# Patient Record
Sex: MALE | Race: WHITE | Employment: FULL TIME
[De-identification: names, ages, dates, MRNs, and addresses within clinical notes are randomized per-mention and may not be internally consistent; named-entity substitution may affect disease eponyms.]

---

## 2018-01-18 ENCOUNTER — RECORDS - HEALTHEAST (OUTPATIENT)
Dept: ADMINISTRATIVE | Facility: OTHER | Age: 58
End: 2018-01-18

## 2019-12-27 ENCOUNTER — TRANSFERRED RECORDS (OUTPATIENT)
Dept: HEALTH INFORMATION MANAGEMENT | Facility: CLINIC | Age: 59
End: 2019-12-27

## 2020-08-28 ENCOUNTER — TRANSFERRED RECORDS (OUTPATIENT)
Dept: HEALTH INFORMATION MANAGEMENT | Facility: CLINIC | Age: 60
End: 2020-08-28

## 2020-09-03 ENCOUNTER — TRANSCRIBE ORDERS (OUTPATIENT)
Dept: OTHER | Age: 60
End: 2020-09-03

## 2020-09-03 DIAGNOSIS — G50.1 ATYPICAL FACIAL PAIN: Primary | ICD-10-CM

## 2020-09-03 DIAGNOSIS — G43.909 MIGRAINE: ICD-10-CM

## 2020-09-08 ENCOUNTER — TELEPHONE (OUTPATIENT)
Dept: NEUROSURGERY | Facility: CLINIC | Age: 60
End: 2020-09-08

## 2020-09-08 NOTE — TELEPHONE ENCOUNTER
Health Call Center    Phone Message    May a detailed message be left on voicemail: yes     Reason for Call: Other: call from [facility] requesting we reach out to patient to schedule appointment for facial pain (referral in epic). Katiuska has phone number for patient listed as [phone], which differs from what we have on file. may be outdated. please call patient to schedule     Action Taken: Message routed to:  [facility]  neurosurg    Travel Screening: Not Applicable

## 2020-09-09 ENCOUNTER — TELEPHONE (OUTPATIENT)
Dept: NEUROSURGERY | Facility: CLINIC | Age: 60
End: 2020-09-09

## 2020-09-09 NOTE — TELEPHONE ENCOUNTER
[facility]    Phone Message    May a detailed message be left on voicemail: yes     Reason for Call: Other: Dilan gold Mandy's call to schedule.  His main number is now updated with the [phone] phone number as the other number he doesn't use alot.  Please call him back at your earliest convenience     Action Taken: Message routed to:  [facility]  Neurosurgery    Travel Screening: Not Applicable

## 2020-09-17 NOTE — TELEPHONE ENCOUNTER
[facility]    Phone Message    May a detailed message be left on voicemail: yes     Reason for Call: Other: Patients spouse calling in regards to patient being seen. Checking the status of scheduling an appointment. Writer noticed patient is scheduled for 10/6 - let Barbara know. Barbara stated patient went to the ED yesterday for pain. Barbara was not aware that patient was scheduled. Barbara is wondering if there is any suggestions for the pain.     Please advise.     Action Taken: Other:  NEUROSURGERY     Travel Screening: Not Applicable

## 2020-09-29 ENCOUNTER — PRE VISIT (OUTPATIENT)
Dept: NEUROSURGERY | Facility: CLINIC | Age: 60
End: 2020-09-29

## 2020-09-29 NOTE — TELEPHONE ENCOUNTER
FUTURE VISIT INFORMATION      FUTURE VISIT INFORMATION:    Date: 10/6/2020    Time: 840am    [place] [facility] [place]  REFERRAL INFORMATION:    Referring provider:      Referring providers clinic:      Reason for visit/diagnosis      RECORDS REQUESTED FROM:       Clinic name Comments Records Status Imaging Status   [facility]  Scanned to Media tab Requested          [facility] MR Cervical Spine 7/25/2014    CT Cervical Spine 7/13/2015, 1/11/2016 Requested  PACS                        Action 10.6.20 sv    Action Taken Received disc from [facility]  MR 1.10.13 head with and without  Sent to [facility] for scanning/upload

## 2020-10-06 ENCOUNTER — OFFICE VISIT (OUTPATIENT)
Dept: NEUROSURGERY | Facility: CLINIC | Age: 60
End: 2020-10-06
Payer: COMMERCIAL

## 2020-10-06 VITALS
WEIGHT: 257 LBS | RESPIRATION RATE: 16 BRPM | HEIGHT: 76 IN | DIASTOLIC BLOOD PRESSURE: 81 MMHG | OXYGEN SATURATION: 96 % | BODY MASS INDEX: 31.29 KG/M2 | HEART RATE: 66 BPM | SYSTOLIC BLOOD PRESSURE: 119 MMHG

## 2020-10-06 DIAGNOSIS — G50.0 TRIGEMINAL NEURALGIA: Primary | ICD-10-CM

## 2020-10-06 PROBLEM — E66.811 OBESITY, CLASS I, BMI 30-34.9: Status: ACTIVE | Noted: 2019-05-20

## 2020-10-06 PROBLEM — I10 ESSENTIAL HYPERTENSION: Status: ACTIVE | Noted: 2019-08-21

## 2020-10-06 PROCEDURE — 99207 PR NON-BILLABLE SERV PER CHARTING: CPT | Performed by: NEUROLOGICAL SURGERY

## 2020-10-06 RX ORDER — BUPROPION HYDROCHLORIDE 300 MG/1
150 TABLET ORAL
COMMUNITY
Start: 2020-06-01 | End: 2020-11-09

## 2020-10-06 RX ORDER — TIZANIDINE 2 MG/1
2 TABLET ORAL
COMMUNITY
Start: 2019-11-04

## 2020-10-06 ASSESSMENT — PAIN SCALES - GENERAL: PAINLEVEL: MODERATE PAIN (5)

## 2020-10-06 ASSESSMENT — MIFFLIN-ST. JEOR: SCORE: 2077.24

## 2020-10-06 NOTE — LETTER
10/6/2020       RE: Dilan Hawkins  [address]  [address]  [facility] [address]     Dear Colleague,    Thank you for referring your patient, Dilan Hawkins, to the [facility] NEUROSURGERY CLINIC [place] at [facility]. Please see a copy of my visit note below.    We saw  in clinic today for atypical facial pain.  Mr. Hawkins is accompanied by his wife.  He has been suffering from facial pain ever since he had his wisdom tooth removed when he was 17 years old.  This has been in waxing and waning with variable intensity throughout his life.  He has [place] multiple doctors for [place] the [place].  His pain originates  behind his molar teeth on the upper jaw.  He does not have typical shooting pain.  He has not had any skull base MRI performed to assess for any vascular compression over the trigeminal nerve.  This pain has caused him significant emotional distress.  We intend to get a baseline MRI base of skull as initial step in his evaluation.  We will see him back in clinic after the MRI results are back and discuss further treatment options for his facial pain.    Patient discussed with staff Dr. Esquivel.    John Lanza   [facility] Fellow  [phone]      Again, thank you for allowing me to participate in the care of your patient.  Sincerely,    Hira Esquivel MD

## 2020-10-06 NOTE — LETTER
10/6/2020       RE: Dilan Hawkins  [address]  [address]  [place] [place] [address]     Dear Colleague,    Thank you for referring your patient, Dilan Hawkins, to the [facility] NEUROSURG[facility] [place] [facility] Please see a copy of my visit note below.    We saw  in clinic today for atypical facial pain.  Mr. Hawkins is accompanied by his wife.  He has been suffering from facial pain ever since he had his wisdom tooth removed when he was 17 years old.  This has been in waxing and waning with variable intensity throughout his life.  He has seen multiple doctors for this across the state.  His pain originates  behind his molar teeth on the upper jaw.  He does not have typical shooting pain.  He has not had any skull base MRI performed to assess for any vascular compression over the trigeminal nerve.  This pain has caused him significant emotional distress.  We intend to get a baseline MRI base of skull as initial step in his evaluation.  We will see him back in clinic after the MRI results are back and discuss further treatment options for his facial pain.    Patient discussed with staff Dr. Esquivel.        John Lanza   [facility] Fellow  [phone]      Again, thank you for allowing me to participate in the care of your patient.      Sincerely,    Hira Esquivel MD

## 2020-10-06 NOTE — PATIENT INSTRUCTIONS
MRI Skull Base with and without contrast  Follow up with Dr Esquivel in 2 weeks    Call Tiny THOMPSON for questions/concerns     Thank you for using [facility]

## 2020-10-07 NOTE — PROGRESS NOTES
We saw  in clinic today for atypical facial pain.  Mr. Hawkins is accompanied by his wife.  He has been suffering from facial pain ever since he had his wisdom tooth removed when he was 17 years old.  This has been in waxing and waning with variable intensity throughout his life.  He has seen multiple doctors for this across the state.  His pain originates  behind his molar teeth on the upper jaw.  He does not have typical shooting pain.  He has not had any skull base MRI performed to assess for any vascular compression over the trigeminal nerve.  This pain has caused him significant emotional distress.  We intend to get a baseline MRI base of skull as initial step in his evaluation.  We will see him back in clinic after the MRI results are back and discuss further treatment options for his facial pain.    Patient discussed with staff Dr. Esquivel.        John CLEMENTE Fellow  [phone]

## 2020-10-08 ENCOUNTER — TELEPHONE (OUTPATIENT)
Dept: NEUROSURGERY | Facility: CLINIC | Age: 60
End: 2020-10-08

## 2020-10-08 NOTE — TELEPHONE ENCOUNTER
Return call to wife, Mignon,    Reviewed MRI Skull Base at 1130 this Friday and F/U with Dr Esquivel on Tues 10/20 @ 8am.    Patient asking for earlier appointment, that is the next time Dr Esquivel is in clinic.      Voices understanding, will keep current appts.  Patient has my name and number if needed.

## 2020-10-09 ENCOUNTER — ANCILLARY PROCEDURE (OUTPATIENT)
Dept: MRI IMAGING | Facility: CLINIC | Age: 60
End: 2020-10-09
Attending: NEUROLOGICAL SURGERY
Payer: COMMERCIAL

## 2020-10-09 DIAGNOSIS — G50.0 TRIGEMINAL NEURALGIA: ICD-10-CM

## 2020-10-09 RX ORDER — GADOBUTROL 604.72 MG/ML
7.5 INJECTION INTRAVENOUS ONCE
Status: COMPLETED | OUTPATIENT
Start: 2020-10-09 | End: 2020-10-09

## 2020-10-09 RX ADMIN — GADOBUTROL 7.5 ML: 604.72 INJECTION INTRAVENOUS at 12:41

## 2020-10-09 RX ADMIN — GADOBUTROL 4.2 ML: 604.72 INJECTION INTRAVENOUS at 12:41

## 2020-10-13 ENCOUNTER — TELEPHONE (OUTPATIENT)
Dept: NEUROSURGERY | Facility: CLINIC | Age: 60
End: 2020-10-13

## 2020-10-13 NOTE — TELEPHONE ENCOUNTER
[facility]    Phone Message    May a detailed message be left on voicemail: yes     Reason for Call: Other: Dilan calling to return a missed call from Mandy. He stated that it was very important to keep his appointment as in clinic. Please call Dilan with any questions or concerns.     Action Taken: Message routed to:  [facility]    Travel Screening: Not Applicable

## 2020-10-15 ENCOUNTER — DOCUMENTATION ONLY (OUTPATIENT)
Dept: CARE COORDINATION | Facility: CLINIC | Age: 60
End: 2020-10-15

## 2020-10-15 ENCOUNTER — TELEPHONE (OUTPATIENT)
Dept: NEUROSURGERY | Facility: CLINIC | Age: 60
End: 2020-10-15

## 2020-10-15 NOTE — TELEPHONE ENCOUNTER
[facility]    Phone Message    May a detailed message be left on voicemail: yes     Reason for Call: Other: Barbara reporting that Mandy has left msgs on pt's ph# but Barbara  reporting that Mandy will not reach pt/Barbara on that phone. Pt does not . Barbara asking Mandy to call Barbara's ph# [phone]. Thank you.    Action Taken: Message routed to:  [facility]  Neurosurgery clinic    Travel Screening: Not Applicable

## 2020-10-20 ENCOUNTER — OFFICE VISIT (OUTPATIENT)
Dept: NEUROSURGERY | Facility: CLINIC | Age: 60
End: 2020-10-20
Payer: COMMERCIAL

## 2020-10-20 VITALS
HEIGHT: 76 IN | HEART RATE: 71 BPM | OXYGEN SATURATION: 96 % | DIASTOLIC BLOOD PRESSURE: 84 MMHG | WEIGHT: 251 LBS | BODY MASS INDEX: 30.56 KG/M2 | RESPIRATION RATE: 16 BRPM | SYSTOLIC BLOOD PRESSURE: 124 MMHG

## 2020-10-20 DIAGNOSIS — G50.0 TRIGEMINAL NEURALGIA: Primary | ICD-10-CM

## 2020-10-20 PROCEDURE — 99215 OFFICE O/P EST HI 40 MIN: CPT | Performed by: NEUROLOGICAL SURGERY

## 2020-10-20 ASSESSMENT — MIFFLIN-ST. JEOR: SCORE: 2050.03

## 2020-10-20 ASSESSMENT — PAIN SCALES - GENERAL: PAINLEVEL: SEVERE PAIN (6)

## 2020-10-20 NOTE — LETTER
10/20/2020       RE: Dilan Hawkins  [address]  [address]  [place] [address]     Dear Colleague,    Thank you for referring your patient, Dilan Hawkins, to the [facility] NEUROSURGERY CLINIC [place] at [facility]. Please see a copy of my visit note below.    Service Date: 10/20/2020      Phyllis Momo,    [facility]    [address] [id number]      RE: Dilan Hawkins    MRN: [id number]   : 1960      Dear Dr. Barr:       I had the pleasure to see Emilio today in my Neurosurgical Clinic for evaluation of right-sided facial pain.      Briefly, Emilio is a 60-year-old gentleman who has had facial pain ever since the age of 17 when he had a tooth extracted.  Following that tooth extraction, he over time developed pain that was near that right tooth and then over time began to migrate into the right jaw.  Over the years this has persistently gotten worse and has been impacting his life significantly.  There have been times in his life where he has been suicidal.  He has wound up in psych wards because of it.  He has lost jobs over this.  He has seen numerous doctors over the years, tried numerous medications and has not had much success in managing this pain.  He describes he is very sensitive to medications, and so he does admit that this is a bit challenging when trying to treat this pain with some providers, but also noted that he had never really had a provider that stuck with him for a long time that has really gone through a lot of medications until at least his involvement with you.      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  I would say that it probably started with that tooth extraction when he was 17.  We did do a high-resolution MRI of the skull base, and this does not show any compression of the trigeminal nerve as is somewhat expected given the diagnosis.      Given  how sensitive he is to medication and his long history of working with providers, I think he is an ideal candidate to work with our Facial Pain group.  I would like to get him set up with our Facial Pain group, which will meet again in 4 weeks.  If it is all right with you, I anticipate that he will probably work with Jez Stroud for a while trialing different medications and work really closely with Hawa Mccord, our pharmacist, given his sensitivity to medications.  My hope would be that we can find a medication regimen that he can then continue with you on.  If we are unsuccessful in getting any medication to work, he might be then a candidate for neuromodulation, and I would ask my partner Alberto Arias then to get involved.  Again, we want to work with you with this process and keep you in the loop and ultimately have him be managed by you in the long run.  In the short term, however, if it is all right, again we will get him into our Facial Pain group and work with him for a while to try to get him on the optimal regimen.      Today I am going to start him on Cymbalta.  I again will do this in conjunction with Hawa Mccord given his sensitivity to medications.      Overall, I spent approximately 40 minutes with Dilan with the majority of this time spent in consultation and developing a treatment plan.      Again, thank you for allowing me to participate in the care of your patient.    With kindest personal regards,         COURTNEY CARRIZALES MD  D: 10/20/2020   T: 10/20/2020   MT: rodrigo      Name:     DILAN MELTON   MRN:      [id number]        Account:      [id number]   :      1960           Service Date: 10/20/2020      Document: [id number]

## 2020-10-20 NOTE — PATIENT INSTRUCTIONS
Follow up with Facial Pain clinic     Hawa Mccord, Alireza will be calling patient to discuss medication for facial pain.  Note sent by Tiny THOMPSON to Hawa to start Cymbalta.     Call Tiny THOMPSON for questions/concerns     Thank you for using [facility]

## 2020-10-20 NOTE — PROGRESS NOTES
Service Date: 10/20/2020      Phyllis Barr    [facility]    [address] [place] [address]      RE: Dilan Hawkins    MRN: [id number]   : 1960      Dear Dr. Barr:       I had the pleasure to see Emilio today in my Neurosurgical Clinic for evaluation of right-sided facial pain.      Briefly, Emilio is a 60-year-old gentleman who has had facial pain ever since the age of 17 when he had a tooth extracted.  Following that tooth extraction, he over time developed pain that was near that right tooth and then over time began to migrate into the right jaw.  Over the years this has persistently gotten worse and has been impacting his life significantly.  There have been times in his life where he has been suicidal.  He has wound up in psych wards because of it.  He has lost jobs over this.  He has seen numerous doctors over the years, tried numerous medications and has not had much success in managing this pain.  He describes he is very sensitive to medications, and so he does admit that this is a bit challenging when trying to treat this pain with some providers, but also noted that he had never really had a provider that stuck with him for a long time that has really gone through a lot of medications until at least his involvement with you.      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  I would say that it probably started with that tooth extraction when he was 17.  We did do a high-resolution MRI of the skull base, and this does not show any compression of the trigeminal nerve as is somewhat expected given the diagnosis.      Given how sensitive he is to medication and his long history of working with providers, I think he is an ideal candidate to work with our Facial Pain group.  I would like to get him set up with our Facial Pain group, which will meet again in 4 weeks.  If it is all right with you, I anticipate that he will probably work with Don  Luanne for a while trialing different medications and work really closely with Hawa Mccord, our pharmacist, given his sensitivity to medications.  My hope would be that we can find a medication regimen that he can then continue with you on.  If we are unsuccessful in getting any medication to work, he might be then a candidate for neuromodulation, and I would ask my partner Alberto Arias then to get involved.  Again, we want to work with you with this process and keep you in the loop and ultimately have him be managed by you in the long run.  In the short term, however, if it is all right, again we will get him into our Facial Pain group and work with him for a while to try to get him on the optimal regimen.      Today I am going to start him on Cymbalta.  I again will do this in conjunction with Hawa Mccord given his sensitivity to medications.      Overall, I spent approximately 40 minutes with Dilan with the majority of this time spent in consultation and developing a treatment plan.      With kindest personal regards,      MD COURTNEY Pierce MD             D: 10/20/2020   T: 10/20/2020   MT: rodrigo      Name:     DILAN MELTON   MRN:      [id number]        Account:      [id number]   :      1960           Service Date: 10/20/2020      Document: [id number]

## 2020-10-22 ENCOUNTER — TELEPHONE (OUTPATIENT)
Dept: OTOLARYNGOLOGY | Facility: CLINIC | Age: 60
End: 2020-10-22

## 2020-10-22 NOTE — TELEPHONE ENCOUNTER
LVM stating I am attempting to schedule patient in Facial Pain clinic per message from Tiny HINDS RN

## 2020-10-29 ENCOUNTER — TELEPHONE (OUTPATIENT)
Dept: PHARMACY | Facility: CLINIC | Age: 60
End: 2020-10-29

## 2020-10-29 DIAGNOSIS — M79.18 MYOFASCIAL PAIN: Primary | ICD-10-CM

## 2020-10-29 RX ORDER — DULOXETIN HYDROCHLORIDE 20 MG/1
CAPSULE, DELAYED RELEASE ORAL
Qty: 90 CAPSULE | Refills: 2 | Status: SHIPPED | OUTPATIENT
Start: 2020-10-29 | End: 2021-05-11

## 2020-11-04 NOTE — TELEPHONE ENCOUNTER
Had a conversation with Barbara about Emilio's pain and beginning a trial of duloxetine. He is in significant discomfort and would like to start the medication. We determined it would be best to start at the lowest dose and then we will have an appointment next week to review his medications and assess how duloxetine is working.  Hawa Mccord, AmilcarD, BCPS

## 2020-11-09 ENCOUNTER — VIRTUAL VISIT (OUTPATIENT)
Dept: PHARMACY | Facility: CLINIC | Age: 60
End: 2020-11-09
Payer: COMMERCIAL

## 2020-11-09 DIAGNOSIS — M79.18 MYOFASCIAL PAIN: Primary | ICD-10-CM

## 2020-11-09 PROCEDURE — 99605 MTMS BY PHARM NP 15 MIN: CPT | Mod: TEL | Performed by: PHARMACIST

## 2020-11-09 RX ORDER — GABAPENTIN 300 MG/1
300 CAPSULE ORAL
COMMUNITY

## 2020-11-09 NOTE — PROGRESS NOTES
S/O:  Dilan Hawkins is a 60 year old male coming in for a follow-up visit for Medication Therapy Management.  He was referred to me from Dr. Esquivel in neurosurgery.     Medication comments/concerns are: better pain management. This initial visit is in follow up from the request from neurosurgery to begin duloextine.     Pain management: Emilio has had pain in his head and neck following a number of surgical procedures. His pain is often 8+ and his quality of life is impacted. He is also very sensitive to medications which is why he was referred to a pharmacists. My conversation with Barbara from 1.5 weeks ago indicated that his pain was significant and he was in too much pain to have a conversation. We discussed altering his gabapentin dosing schedule from 600 mg three times a day to 300 [id number] and 600 mg at his bedtime. Addtionally, duloextine was to be started at 20 mg a day, until we could meet today.     He also takes lamotrigine for his mood and his is tolerating his current dose well. Tizanidine is used more for sleep than muscle relaxation.    Today his pain is at a 1. He did have a massage and craniosacral release last week, but he had improvement prior to those interventions.    Assessment:  Current medications were reviewed with him today.      Pain management: Emilio is pleased with his current pain control. We discussed furthering increasing the dose. He is going to remain on 20 mg of duloxetine for a least another week before he would want to attempt an increase. He reports the adjustment to his gabapentin has helped him feel better as well.     There are no concerns about his lamotrigine or tizanidine.    Dilan Hawkins's medication experience affecting medication outcomes for today's visit: None identified today   Plan:  1. Continue duloxetine 20 mg daily.  2. Continue gabapentin [id number]  3. Follow up at the interprofessional facial pain clinic in December.        I spent 15 minutes with this patient today. Adjustments made in agreement with Dr. Esquivel.     Will follow up in 6 weeks.    The patient was provided with a summary of these recommendations in an after visit summary.    Hawa Mccord, PharmD, [facility]  Medication Therapy Management Pharmacist

## 2020-11-29 ENCOUNTER — HEALTH MAINTENANCE LETTER (OUTPATIENT)
Age: 60
End: 2020-11-29

## 2020-12-09 DIAGNOSIS — M79.18 MYOFASCIAL PAIN: Primary | ICD-10-CM

## 2020-12-09 RX ORDER — LIDOCAINE HYDROCHLORIDE 20 MG/ML
SOLUTION OROPHARYNGEAL
Qty: 100 ML | Refills: 0 | Status: SHIPPED | OUTPATIENT
Start: 2020-12-09

## 2020-12-09 NOTE — TELEPHONE ENCOUNTER
Emilio called with some pain exacerbation in his jaw. Despite this, he is pleased with how well the duloxetine is helping his pain and resiliency. He has had some restless legs, but adjusted the timing of his bedtime gabapentin and that has helped. Today, we discussed giving some topical lidocaine a try. I ran this by Dr. Esquivel and he agreed. We will see Emilio Dec 21 in facial pain clinic and follow up.   Hawa Mccord, AmilcarD, BCPS

## 2020-12-21 ENCOUNTER — APPOINTMENT (OUTPATIENT)
Dept: OTOLARYNGOLOGY | Facility: CLINIC | Age: 60
End: 2020-12-21
Payer: COMMERCIAL

## 2020-12-21 ENCOUNTER — VIRTUAL VISIT (OUTPATIENT)
Dept: OTOLARYNGOLOGY | Facility: CLINIC | Age: 60
End: 2020-12-21
Payer: COMMERCIAL

## 2020-12-21 VITALS — WEIGHT: 251 LBS | BODY MASS INDEX: 30.56 KG/M2 | HEIGHT: 76 IN

## 2020-12-21 DIAGNOSIS — G50.0 TRIGEMINAL NEURALGIA: Primary | ICD-10-CM

## 2020-12-21 DIAGNOSIS — R51.9 FACIAL PAIN: Primary | ICD-10-CM

## 2020-12-21 PROCEDURE — 99207 PR NO CHARGE LOS: CPT | Performed by: OTOLARYNGOLOGY

## 2020-12-21 PROCEDURE — 99207 PR NO CHARGE LOS: CPT | Mod: 95 | Performed by: NEUROLOGICAL SURGERY

## 2020-12-21 PROCEDURE — 99207 PR NON-BILLABLE SERV PER CHARTING: CPT | Performed by: PSYCHIATRY & NEUROLOGY

## 2020-12-21 ASSESSMENT — MIFFLIN-ST. JEOR: SCORE: 2050.03

## 2020-12-21 NOTE — PATIENT INSTRUCTIONS
Dr. Stroud office will be calling patient for an appointment.    Follow up as needed with Facial Pain Clinic.    Thank you for using [employer]

## 2020-12-21 NOTE — LETTER
12/21/2020      RE: Dilan Hawkins  [address]   [address]  [facility] [place] [address]       See dictation      Hira Esquivel MD

## 2020-12-21 NOTE — PROGRESS NOTES
"Dilan Hawkins is a 60 year old male who is being evaluated via a billable video visit.      The patient has been notified of following:     \"This video visit will be conducted via a call between you and your physician/provider. We have found that certain health care needs can be provided without the need for an in-person physical exam.  This service lets us provide the care you need with a video conversation.  If a prescription is necessary we can send it directly to your pharmacy.  If lab work is needed we can place an order for that and you can then stop by our lab to have the test done at a later time.    Video visits are billed at different rates depending on your insurance coverage.  Please reach out to your insurance provider with any questions.    If during the course of the call the physician/provider feels a video visit is not appropriate, you will not be charged for this service.\"    Patient has given verbal consent for Video visit? Yes  How would you like to obtain your AVS? MyChart  If you are dropped from the video visit, the video invite should be resent to: Send to e-mail at: [email or web address]  Will anyone else be joining your video visit? No        Video-Visit Details    Patient was seen in multidisciplinary facial pain clinic. Due to conflicting patient care duties I was unable to participate significantly in his evaluation and treatment today.       "

## 2020-12-21 NOTE — LETTER
12/21/2020       RE: Dilan Hawkins  [address]  [address]  [place] [address]     Dear Colleague,    Thank you for referring your patient, Dilan Hawkins, to the [facility] EAR NOSE AND THROAT CLINIC [place] at [facility]. Please see a copy of my visit note below.    See dictation      Again, thank you for allowing me to participate in the care of your patient.      Sincerely,    Hira Esquivel MD

## 2020-12-21 NOTE — PROGRESS NOTES
I saw the patient as part of a group visit for complex facial pain clinic.  I will not charge as there is no neurological follow up plan for the patient visits. Please see Dr. Stroud's note for details. Mr Hawkins is going to follow up with dental clinic.     Valentina Roca MD FAAN  Department of Neurology

## 2020-12-21 NOTE — LETTER
12/21/2020       RE: Dilan Hawkins  [address]  [address]  [facility] [place] [address]     Dear Colleague,    Thank you for referring your patient, Dilan Hawkins, to the [facility] EAR NOSE AND THROAT CLINIC [place] at [facility]. Please see a copy of my visit note below.    I saw the patient as part of a group visit for complex facial pain clinic.  I will not charge as there is no neurological follow up plan for the patient visits. Please see Dr. Stroud's note for details. Mr Hawkins is going to follow up with dental clinic.     Valentina Roca MD [facility]  Department of Neurology      Again, thank you for allowing me to participate in the care of your patient.      Sincerely,    Valentina Roca MD

## 2020-12-21 NOTE — LETTER
"12/21/2020       RE: Dilan Hawkins  [address]  [address]  [place] [address]     Dear Colleague,    Thank you for referring your patient, Dilan Hawkins, to the [facility] EAR NOSE AND THROAT CLINIC [place] at [facility]. Please see a copy of my visit note below.    Dilan Hawkins is a 60 year old male who is being evaluated via a billable video visit.      The patient has been notified of following:     \"This video visit will be conducted via a call between you and your physician/provider. We have found that certain health care needs can be provided without the need for an in-person physical exam.  This service lets us provide the care you need with a video conversation.  If a prescription is necessary we can send it directly to your pharmacy.  If lab work is needed we can place an order for that and you can then stop by our lab to have the test done at a later time.    Video visits are billed at different rates depending on your insurance coverage.  Please reach out to your insurance provider with any questions.    If during the course of the call the physician/provider feels a video visit is not appropriate, you will not be charged for this service.\"    Patient has given verbal consent for Video visit? Yes  How would you like to obtain your AVS? MyChart  If you are dropped from the video visit, the video invite should be resent to: Send to e-mail at: [email or web address]  Will anyone else be joining your video visit? No        Video-Visit Details    Patient was seen in multidisciplinary facial pain clinic. Due to conflicting patient care duties I was unable to participate significantly in his evaluation and treatment today.           Again, thank you for allowing me to participate in the care of your patient.      Sincerely,    Bella Mullen MD      "

## 2020-12-23 NOTE — PROGRESS NOTES
We saw Emilio in our multidisciplinary clinic.  We believe he has neuropathic pain related to a tooth extraction.  He will follow up with Dr. Stroud for medical management of this pain.

## 2021-02-09 DIAGNOSIS — M79.2 NEUROPATHIC PAIN: Primary | ICD-10-CM

## 2021-02-19 ENCOUNTER — HOSPITAL ENCOUNTER (OUTPATIENT)
Dept: NUCLEAR MEDICINE | Facility: CLINIC | Age: 61
Setting detail: NUCLEAR MEDICINE
End: 2021-02-19
Attending: DENTIST
Payer: COMMERCIAL

## 2021-02-19 DIAGNOSIS — M79.2 NEUROPATHIC PAIN: ICD-10-CM

## 2021-02-19 PROCEDURE — 78315 BONE IMAGING 3 PHASE: CPT | Mod: 26

## 2021-02-19 PROCEDURE — A9503 TC99M MEDRONATE: HCPCS | Performed by: DENTIST

## 2021-02-19 PROCEDURE — 78830 RP LOCLZJ TUM SPECT W/CT 1: CPT

## 2021-02-19 PROCEDURE — 343N000001 HC RX 343: Performed by: DENTIST

## 2021-02-19 RX ORDER — TC 99M MEDRONATE 20 MG/10ML
20-30 INJECTION, POWDER, LYOPHILIZED, FOR SOLUTION INTRAVENOUS ONCE
Status: COMPLETED | OUTPATIENT
Start: 2021-02-19 | End: 2021-02-19

## 2021-02-19 RX ADMIN — TC 99M MEDRONATE 23.7 MCI.: 20 INJECTION, POWDER, LYOPHILIZED, FOR SOLUTION INTRAVENOUS at 09:22

## 2021-04-10 ENCOUNTER — HEALTH MAINTENANCE LETTER (OUTPATIENT)
Age: 61
End: 2021-04-10

## 2021-05-07 DIAGNOSIS — M79.18 MYOFASCIAL PAIN: ICD-10-CM

## 2021-05-11 RX ORDER — DULOXETIN HYDROCHLORIDE 20 MG/1
20 CAPSULE, DELAYED RELEASE ORAL 2 TIMES DAILY
Qty: 180 CAPSULE | Refills: 2 | Status: SHIPPED | OUTPATIENT
Start: 2021-05-11

## 2021-05-24 ENCOUNTER — RECORDS - HEALTHEAST (OUTPATIENT)
Dept: ADMINISTRATIVE | Facility: CLINIC | Age: 61
End: 2021-05-24

## 2021-06-11 ENCOUNTER — TELEPHONE (OUTPATIENT)
Dept: BEHAVIORAL HEALTH | Facility: CLINIC | Age: 61
End: 2021-06-11

## 2021-06-11 NOTE — TELEPHONE ENCOUNTER
"Emilio called back and left a VM letting me know that he is doing okay, but would like to talk. He is aware I will be out of town for a few weeks, but says \"no rush\" - and to get back to him on my return. He left his phone number: [phone] for that contact.    Arpan Ornelas, [facility]  "

## 2021-07-01 ENCOUNTER — TELEPHONE (OUTPATIENT)
Dept: NEUROSURGERY | Facility: CLINIC | Age: 61
End: 2021-07-01

## 2021-07-01 NOTE — TELEPHONE ENCOUNTER
Barbara, wife, calling asking about a referral to [facility] for Right sided neck pain, right sided ankle pain from a fall numerous years ago.  This pain is more constant but severity is intermittent.  Patient using Tylenol for this pain.  Walking and completing ADL without issues.      Patient has history of neck fusions.    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

## 2021-07-31 ENCOUNTER — HEALTH MAINTENANCE LETTER (OUTPATIENT)
Age: 61
End: 2021-07-31

## 2021-09-19 ENCOUNTER — HEALTH MAINTENANCE LETTER (OUTPATIENT)
Age: 61
End: 2021-09-19

## 2021-11-01 ENCOUNTER — VIRTUAL VISIT (OUTPATIENT)
Dept: BEHAVIORAL HEALTH | Facility: CLINIC | Age: 61
End: 2021-11-01
Payer: COMMERCIAL

## 2021-11-01 DIAGNOSIS — F43.12 CHRONIC POST-TRAUMATIC STRESS DISORDER: Primary | ICD-10-CM

## 2021-11-01 PROCEDURE — 90834 PSYTX W PT 45 MINUTES: CPT | Mod: 95 | Performed by: MARRIAGE & FAMILY THERAPIST

## 2021-11-01 NOTE — PROGRESS NOTES
"[facility] ([facility] Clinic referral)  November 1, 2021  Telephone Visit      Behavioral Health Clinician Progress Note    Patient Name: Dilan Hawkins           Service Type:  Individual      Service Location:   Telephone Visit     Session Start Time: 215pm  Session End Time: 3pm      Session Length: 38 - 52      Attendees: Patient    Visit Activities (Refresh list every visit): NEW and [facility] Only    Diagnostic Assessment Date: none on file with [facility]  Treatment Plan Review Date: none with me yet  CGI-S: 5    See Flowsheets for today's PHQ-9 and NATALIE-7 results  Previous PHQ-9:   PHQ-9 SCORE 6/14/2013   PHQ-9 Total Score 2   Some encounter information is confidential and restricted. Go to Review Flowsheets activity to see all data.     Previous NATALIE-7:   NATALIE-7 SCORE 6/14/2013   Total Score BEH Adult 3   Some encounter information is confidential and restricted. Go to Review Flowsheets activity to see all data.       JEREMY LEVEL:  No flowsheet data found.    DATA  Extended Session (60+ minutes): No  Interactive Complexity: No  Crisis: No  [facility] Patient: No    Treatment Objective(s) Addressed in This Session:  Target Behavior(s): disease management/lifestyle changes      Patient  will develop coping/problem-solving skills to facilitate more adaptive adjustment    Current Stressors / Issues:  The patient has been notified of the following:      \"We have found that certain health care needs can be provided without the need for a face to face visit.  This service lets us provide the care you need with a phone conversation.       I will have full access to your [facility] medical record during this entire phone call. I will be taking notes for your medical record.      Since this is like an office visit, we will bill your insurance company for this service.       There are potential benefits and risks of telephone visits (e.g. limits to patient confidentiality) that differ from in-person visits.?  " "Confidentiality still applies for telephone services, and nobody will record the visit.  It is important to be in a quiet, private space that is free of distractions (including cell phone or other devices) during the visit.??      If during the course of the call I believe a telephone visit is not appropriate, you will not be charged for this service\"     Consent has been obtained for this service by care team member: Yes       [facility] met with Dialn at health care team's request to assess his current behavioral health needs and provide appropriate intervention. Dilan was referred by the [facility] for support with his current difficulties.  We spent today's session discussing Dilan's history, current stressors, and exploring the possible benefits of psychotherapy, options for supporting him. Focus was on establishing a positive therapeutic alliance and establishing initial goals.    Emilio was angry and upset today. His ex-wife Barbara is, as well. He referred to his ex-wife as his \"care advocate,\" and gave me verbal permission to include her in the visit. They are both present for the entire visit. He also made it clear that she is the one to call with messages and information for him, can set his schedule for appointments, and it is okay to use her number in the Epic record. She stated this is true, as well.    Emilio reports that he thought our visit was in-person today and almost left the house to go to the [facility], until he realized differently. He is also distressed that I called late today. I did apologize for this confusion and my lateness, and we spent the rest of the session processing Emilio's other complaints with the [facility] health system.    He reports that when he came to the [place] last year, things were very good for the first few months. He worked with Dr Esquivel and then the [facility]. He has done some work with Dr Mccord for pharmacy and medication concerns " since then, but most of his follow-up has been at the dental clinic with Dr Stroud and the residents there. I do not have access to dental school/clinic records, so am not able to track this care.    Emilio has a number of complaints about his time since then, including:    A resident at the dental clinic reportedly left without informing Emilio he had graduated, and Emilio felt abandoned    He is not sure who should be managing his medications, is not sure who is prescribing his Lyrica    He states that people do not seem to believe he has an underlying infection that is the cause of his pain, and it has been hard to get the treatment he believes he needs    He had an oral surgery scheduled last week related to all of this, that was cancelled, and he reports it was not communicated to him and he has no understanding of why it should not have gone forward    There had been some sort of mix-up about whether he was in a study and had received placebo instead of actual medication    He did not care for the oral surgeon he was working with - and although he would like to reschedule the surgery, he does not want to work with this provider    He reports that there is not adequate treatment/medication for his pain    In general he reports that no one is coordinating his care or supporting them at the [facility]. He has specific complaints about most providers and nurses he has interacted with, but does feel positive about some, as well    Emilio does report that some Botox treatments have been helpful and reports he is scheduled to have more of these in the coming months.     Emilio works second shift get up at 10am.    I did my best to validate their concerns, provide support and listen carefully to their complaints, We began to explore options to help them feel as if they are moving forward. They report that they have talked to patient relations and were referred to another number at [employer], which Barbara will contact soon. I  agreed to reach out to the [facility] team to see if we can sort out where he should be going next for treatment, medication support, etc. I was clear that my role is as a mental health provider, and that although I am happy to assist as I can, I cannot make medical recommendations or manage a complaint process, but did offer to help with some communication and am available for support per mental health/stress.    Emilio reports that he feels quite angry and can get suicidal at times like this. We spent some time reviewing what he means. He is quite clear that he has no plan or intent at the moment, but reports that he gets distressed when he cannot get the care he deserves. He has dealt with this pain for many years, he reports, and becomes frustrated. Emilio reports that his work is impacted by his pain. 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     [place] Suicide Severity Rating Scale (Short Version)  [place] Suicide Severity Rating (Short Version) 11/5/2021   Over the past 2 weeks have you felt down, depressed, or hopeless? yes   Over the past 2 weeks have you had thoughts of killing yourself? yes   Have you ever attempted to kill yourself? no   Q1 Wished to be Dead (Past Month) yes   Q2 Suicidal Thoughts (Past Month) yes   Q3 Suicidal Thought Method yes   Q4 Suicidal Intent without Specific Plan no   Q5 Suicide Intent with Specific Plan no   Q6 Suicide Behavior (Lifetime) no     Emilio does report some passive suicidal ideation, that per chart review and conversation today, appears to be chronic and episodic in nature. While he does have some risk factors for self-harm, including age and comorbid medical condition of facial pain, risk is mitigated by commitment to family, absence of past attempts, history of seeking help when needed, future orientation and pursuit of ongoing health  care, and willingness to engage in mental health support. Therefore, based on all available evidence including the factors cited above, he does not appear to be at imminent risk for self-harm, does not meet criteria for a 72-hr hold, and therefore remains appropriate for ongoing outpatient level of care. Voluntary referral for [facility] services and/or referral to outpatient care was offered, he accepted this offer.    We agree that I will do the following to gather information, and we have scheduled a follow-up meeting for next week:  a. Talk to care team  b. Determine if a care coordinator service is available  c. Therapy support for him and Barbara if they desire it  d. Explore who should be managing any pain meds.   e. Explore if they can reschedule the oral surgery with a different provider    I affirmed the steps this patient has taken to address physical and behavioral health issues, and offered continued behavioral health services or referral, now or in the future, as needed by the patient.    Progress on Treatment Objective(s) / Homework:  New Objective established this session - PREPARATION (Decided to change - considering how); Intervened by negotiating a change plan and determining options / strategies for behavior change, identifying triggers, exploring social supports, and working towards setting a date to begin behavior change    Interventions drawn from:  Psycho-education regarding mental health diagnoses and treatment options    Motivational Interviewing    Solution-Focused Therapy    Explored patterns in patient's behaviors and relationships and discussed options for new behaviors    Explored new options for problem-solving, communication, managing stress, etc.    Behavioral Activation    Discussed steps patient can take to become more involved in meaningful activity    Identified barriers to these activities and explored possible solutions    Medication Review:  No problems reported to [facility]  today.    Medication Compliance:  No problems reported to [facility] today.    Changes in Health Issues:  No problems reported to [facility] today.    Chemical Use Review:   Substance Use: Not discussed today, no concerns in chart or hx     Tobacco Use: Not discussed today    Mental Status Assessment:  Appearance:   Unable to assess, telephone visit  Eye Contact:   Unable to assess, telephone visit  Psychomotor Behavior: Unable to assess, telephone visit  Attitude:   Cooperative  Guarded   Orientation:   All  Speech   Rate / Production: Normal/ Responsive Talkative   Volume:  Normal   Mood:    Anxious  Depressed  Irritable   Affect:    Appropriate  Labile   Thought Content:  Clear   Thought Form:  Coherent  Logical   Insight:    Fair     Safety Assessment:  Patient has had a history of suicidal ideation:    Patient reports the following current or recent suicidal ideation or behaviors: reports SI in response to frustrations in dealing with healthcare system - denies any active thoughts, plan or intent.  Patient denies current or recent homicidal ideation or behaviors.  Patient denies current or recent self injurious behavior or ideation.  Patient denies other safety concerns.  Protective Factors Sense of responsibility to family, Positive coping skills, Positive problem-solving skills and Positive social support   Risk Factors Intense emotionality    Plan for Safety and Risk Management:  A safety and risk management plan has not been developed at this time, however patient was encouraged to call [facility] should there be a change in any of these risk factors.    Diagnoses:  1. Chronic post-traumatic stress disorder    Dx per chart review/hx. Chart also includes historical dx of bipolar and bipolar 2 disorder, adhd, generalized anxiety.    Collateral Reports Completed:  Will collaborate with care team as indicated during treatment    Plan: (Homework, other):  Patient was given information about behavioral services and  encouraged to schedule a follow up appointment with the clinic [facility] in 1 week.      He was also given information about mental health symptoms and treatment options .  CD Recommendations: No indications of CD issues.     Emilio does report some passive suicidal ideation, that per chart review and conversation today, appears to be chronic and episodic in nature. While he does have some risk factors for self-harm, including age and comorbid medical condition of facial pain, risk is mitigated by commitment to family, absence of past attempts, history of seeking help when needed, future orientation and pursuit of ongoing health care, and willingness to engage in mental health support. Therefore, based on all available evidence including the factors cited above, he does not appear to be at imminent risk for self-harm, does not meet criteria for a 72-hr hold, and therefore remains appropriate for ongoing outpatient level of care. Voluntary referral for [facility] services and/or referral to outpatient care was offered, he accepted this offer.    We agree that I will do the following to gather information, and we have scheduled a follow-up meeting for next week:  a. Talk to care team  b. Determine if a care coordinator service is available  c. Therapy support for him and Barbara if they desire it  d. Explore who should be managing any pain meds.   e. Explore if they can reschedule the oral surgery with a different provider      ANUPAM Hanson, [facility]

## 2021-11-10 ENCOUNTER — VIRTUAL VISIT (OUTPATIENT)
Dept: BEHAVIORAL HEALTH | Facility: CLINIC | Age: 61
End: 2021-11-10
Payer: COMMERCIAL

## 2021-11-10 DIAGNOSIS — F43.12 CHRONIC POST-TRAUMATIC STRESS DISORDER: Primary | ICD-10-CM

## 2021-11-10 PROCEDURE — 90837 PSYTX W PT 60 MINUTES: CPT | Mod: 95 | Performed by: MARRIAGE & FAMILY THERAPIST

## 2021-11-10 NOTE — PROGRESS NOTES
[facility] (Facial Pain Clinic referral)  November 10, 2021  VideoVisit      Behavioral Health Clinician Progress Note    Patient Name: Dilan Hawkins           Service Type:  Individual      Service Location:   Video Visit     Session Start Time: 11am  Session End Time: 1155am      Session Length: 53 - 60      Attendees: Patient and ex-wife, Barbara    Visit Activities (Refresh list every visit): [facility] Only    Diagnostic Assessment Date: none on file with [facility]  Treatment Plan Review Date: none with me yet  CGI-S: 5    See Flowsheets for today's PHQ-9 and NATALIE-7 results  Previous PHQ-9:   PHQ-9 SCORE 6/14/2013 6/18/2013   PHQ-9 Total Score 2 2     Previous NATALIE-7:   NATALIE-7 SCORE 6/14/2013 6/18/2013   Total Score BEH Adult 3 3       JEREMY LEVEL:  No flowsheet data found.    DATA  Extended Session (60+ minutes): No  Interactive Complexity: No  Crisis: No  [facility] Patient: No    Treatment Objective(s) Addressed in This Session:  Target Behavior(s): disease management/lifestyle changes      Patient  will develop coping/problem-solving skills to facilitate more adaptive adjustment    Current Stressors / Issues:  Telemedicine Visit: The patient's condition can be safely assessed and treated via synchronous audio and visual telemedicine encounter.      Reason for Telemedicine Visit: Services only offered telehealth    Originating Site (Patient Location): Patient's home    Distant Site (Provider Location): Provider Remote Setting- Home Office    Consent:  The patient/guardian has verbally consented to: the potential risks and benefits of telemedicine (video visit) versus in person care; bill my insurance or make self-payment for services provided; and responsibility for payment of non-covered services.     Mode of Communication:  Video Conference via Remotium    As the provider I attest to compliance with applicable laws and regulations related to telemedicine.      [facility] met with Dilan galvan [facility]  team's request to assess his current behavioral health needs and provide appropriate intervention. I was following up with Emilio and his wife Barbara today, after talking with them about a week ago.     Emilio continues to state that he is upset about the care he has received at the [place]. He has had his oral surgery rescheduled for this coming Friday, and is cautiously hopeful about it, he says. He is upset about his pain management in general, however, and says it is not being addressed. He says his pain is a 7/8 out of 10 and that anyone else would be hospitalized for this. He is unhappy that he is not given medication for this pain.    I reported on the things I have found out over the last week: That the dental clinic is happy to continue seeing him and helping as they can; that he has had some success with Botox and another injection, and that they are happy to offer those services ongoing; that neurology is not following with him at the moment, but that Dr lAvin Rosales's nurse is happy to be of help, if that is indicated; that the dental clinic can assist with medication and refills as appropriate, and that he can speak to them about his Lyrica prescription.     I did also reiterate that he is welcome to contact the patient liaison to register any complaints he has. I spent much of this visit providing space for his complaints and providing validation, empathy and support as I could, also encouraging him to speak with the dental clinic directly to try to have his needs met. My conversation with Dr Stroud this week certainly supported the idea that they are quite willing to work with Emilio, but they are aware he is not happy with the results of his work there, so far.     Emilio states that he is angry and hurting - and what he wants most is an apology.    We had a conversation about pain management, and Emilio was direct in stating that he feels he needs something like hydrocodone to adequately manage his  "pain. We discussed some of the common concerns in healthcare these days about prescription of these medications, particularly in terms of chronic pain management, and I did inform Emilio that he might not be able to get this as a standing plan, but encouraged him to have direct conversations with his prescribing providers about his desire.     Emilio also states that he feels as if he has been told that clinicians have not found .the cause of his pain - and that they do not believe he has a condition he feels he does have: \"osteonecrosis cavitation.\"  He reports that he has had conversations with an oral surgeon in [place] about this condition, and may seek out treatment with him in the future. This is a Dr. Jake Gannon, he says.    I reviewed my role in the clinics as behavioral health support, and offered to continue meeting to discuss ways to manage the stress of living with chronic pain, his frustration with the long process of dealing with this issue, explore depression, etc. He reports that he has a regular therapist that he sees every week already, and does not really need more than that at present. I offered to connect in the future, as needed.    Emilio's current plan:    Treatment and diagnostic surgery - debriding and biopsy of jaw - this Friday    Pain management after surgery is the next question - he will discuss this with the oral surgery team and the dental clinic after that    Botox is schedule for later in November but is a little up in the air    Emilio would like me to be sure to share with the facial pain clinic team that any coordination function is really missing from the team and that he has been left to figure things out for himself    We spoke a more about Emilio's mention of thoughts of killing himself. He states to day that he has never attempted to harm himself, and that he has no intent to do so now. He states that he is concerned that if he cannot find relief from his pain, then he may end up " "killing himself. He talks about \"going to [place] [employer]\" to get a gun, but has no gun at him.     [place] Suicide Severity Rating Scale (Lifetime/Recent)  [place] Suicide Severity Rating (Lifetime/Recent) 11/12/2021   1. Wish to be Dead (Lifetime) Yes   Wish to be Dead Description (Lifetime) chronic SI per pain   1. Wish to be Dead (Recent) Yes   Wish to be Dead Description (Recent) chronic SI per pain   2. Non-Specific Active Suicidal Thoughts (Lifetime) Yes   2. Non-Specific Active Suicidal Thoughts (Recent) Yes   Non-Specific Active Suicidal Thought Description (Recent) he reports thoughts he might kill himself in the future due to pain, but no current intent or plans to take action   3. Active Suicidal Ideation with any Methods (Not Plan) Without Intent to Act (Lifetime) Yes   3. Active Suicidal Ideation with any Methods (Not Plan) Without Intent to Act (Recent) Yes   Active Suicidal Ideation with any Methods (Not Plan) Description (Recent) he reports that he has thought about going to [employer] to buy a gun   4. Active Suicidal Ideation with Some Intent to Act, Without Specific Plan (Lifetime) No   4. Active Suicidal Ideation with Some Intent to Act, Without Specific Plan (Recent) No   5. Active Suicidal Ideation with Specific Plan and Intent (Lifetime) No   5. Active Suicidal Ideation with Specific Plan and Intent (Recent) No   Most Severe Ideation Description (Past Month) he reports that the thoughts come and go in duration and intensity, but are most prevlaent when he feels that he cannot find a way to fix his pain; he is able to control these thoughts and states clearly he has never had any active plan to harm himself, never had any intent to carry out a plan, and has no history of any attempts   Actual Attempt (Lifetime) No   Actual Attempt (Past 3 Months) No   Has subject engaged in non-suicidal self-injurious behavior? (Lifetime) No   Has subject engaged in non-suicidal self-injurious behavior? (Past 3 " Months) No   Interrupted Attempts (Lifetime) No   Interrupted Attempts (Past 3 Months) No   Aborted or Self-Interrupted Attempt (Lifetime) No   Aborted or Self-Interrupted Attempt (Past 3 Months) No   Preparatory Acts or Behavior (Lifetime) No   Preparatory Acts or Behavior (Past 3 Months) No     At our last session, Emilio reported that he feels quite angry and can get suicidal at times like this. He was quite clear that he has no plan or intent, but reports that he gets distressed when he cannot get the care he deserves. He has dealt with this pain for many years, he reported, and becomes frustrated.     [place] Suicide Severity Rating Scale (Short Version)  [place] Suicide Severity Rating (Short Version) 11/5/2021   Over the past 2 weeks have you felt down, depressed, or hopeless? yes   Over the past 2 weeks have you had thoughts of killing yourself? yes   Have you ever attempted to kill yourself? no   Q1 Wished to be Dead (Past Month) yes   Q2 Suicidal Thoughts (Past Month) yes   Q3 Suicidal Thought Method yes   Q4 Suicidal Intent without Specific Plan no   Q5 Suicide Intent with Specific Plan no   Q6 Suicide Behavior (Lifetime) no     While Emilio does report chronic but occasional passive suicidal ideation, and he does have some risk factors for self-harm, including age and comorbid medical condition of facial pain, risk is mitigated by commitment to family, absence of past attempts, history of seeking help when needed, future orientation and pursuit of ongoing health care, and willingness to engage in mental health support. Therefore, based on all available evidence including the factors cited above, he does not appear to be at imminent risk for self-harm, does not meet criteria for a 72-hr hold, and therefore remains appropriate for ongoing outpatient level of care. I affirmed the steps this patient has taken to address physical and behavioral health issues, and offered continued behavioral health services or  referral, now or in the future, as needed by the patient. I did encourage him to continue meeting with his regular therapist, as well.    I developed a safety plan for Emilio to use based on our conversation today and at our last session. I will send him a copy of this plan.    Progress on Treatment Objective(s) / Homework:  Minimal progress - PREPARATION (Decided to change - considering how); Intervened by negotiating a change plan and determining options / strategies for behavior change, identifying triggers, exploring social supports, and working towards setting a date to begin behavior change    Interventions drawn from:  Psycho-education regarding mental health diagnoses and treatment options    Motivational Interviewing    Solution-Focused Therapy    Explored patterns in patient's behaviors and relationships and discussed options for new behaviors    Explored new options for problem-solving, communication, managing stress, etc.    Behavioral Activation    Discussed steps patient can take to become more involved in meaningful activity    Identified barriers to these activities and explored possible solutions    Medication Review:  No problems reported to [facility] today.    Medication Compliance:  No problems reported to [facility] today.    Changes in Health Issues:  No problems reported to [facility] today.    Chemical Use Review:   Substance Use: Not discussed today, no concerns in chart or hx     Tobacco Use: Not discussed today    Mental Status Assessment:  Appearance:   Appropriate  Eye Contact:   Good  Psychomotor Behavior: Normal  Attitude:   Cooperative  Guarded   Orientation:   All  Speech   Rate / Production: Normal/ Responsive Talkative   Volume:  Normal   Mood:    Anxious  Depressed  Irritable   Affect:    Appropriate  Labile   Thought Content:  Clear   Thought Form:  Coherent  Logical   Insight:    Fair     Safety Assessment:  Patient has had a history of suicidal ideation:    Patient reports the following current or  recent suicidal ideation or behaviors: reports SI in response to frustrations in dealing with healthcare system - denies any active thoughts, plan or intent.  Patient denies current or recent homicidal ideation or behaviors.  Patient denies current or recent self injurious behavior or ideation.  Patient denies other safety concerns.  Protective Factors Sense of responsibility to family, Positive coping skills, Positive problem-solving skills and Positive social support   Risk Factors Intense emotionality    Plan for Safety and Risk Management:  A safety and risk management plan has not been developed at this time, however patient was encouraged to call [facility] should there be a change in any of these risk factors.    Diagnoses:  1. Chronic post-traumatic stress disorder    Dx per chart review/hx. Chart also includes historical dx of bipolar and bipolar 2 disorder, adhd, generalized anxiety.    Collateral Reports Completed:  Will collaborate with care team as indicated during treatment    Plan: (Homework, other):  While Emilio does report chronic but occasional passive suicidal ideation, and he does have some risk factors for self-harm, including age and comorbid medical condition of facial pain, risk is mitigated by commitment to family, absence of past attempts, history of seeking help when needed, future orientation and pursuit of ongoing health care, and willingness to engage in mental health support. Therefore, based on all available evidence including the factors cited above, he does not appear to be at imminent risk for self-harm, does not meet criteria for a 72-hr hold, and therefore remains appropriate for ongoing outpatient level of care. I affirmed the steps this patient has taken to address physical and behavioral health issues, and offered continued behavioral health services or referral, now or in the future, as needed by the patient. I did encourage him to continue meeting with his regular  "therapist, as well.    I reviewed my role in the clinics as behavioral health support, and offered to continue meeting to discuss ways to manage the stress of living with chronic pain, his frustration with the long process of dealing with this issue, explore depression, etc. He reports that he has a regular therapist that he sees every week already, and does not really need more than that at present. I offered to connect in the future, as needed.    I developed a safety plan for Emilio to use based on our conversation today and at our last session. I will send him a copy of this plan.    Emilio's current plan:    Treatment and diagnostic surgery - debriding and biopsy of jaw - this Friday    Pain management after surgery is the next question - he will discuss this with the oral surgery team and the dental clinic after that    Botox is schedule for later in November but is a little up in the air    Emilio would like me to be sure to share with the facial pain clinic team that any coordination function is really missing from the team and that he has been left to figure things out for himself      ANUPAM Hanson, [facility]    ----------------------------------------------------------------------------------------------------------      Integrated Behavioral Health Services                                      Patient's Name: Dilan Hawkins  November 10, 2021    SAFETY PLAN:  Step 1: Warning signs / cues (Thoughts, images, mood, situation, behavior) that a crisis may be developing:    Thoughts: \"I can't do this anymore\", \"I just want this to end\" and \"Nothing makes it better\"    Images: imagining living with this pain forever    Thinking Processes: ruminations (can't stop thinking about my problems): especially about the pain    Mood: worsening depression, hopelessness, helplessness and intense anger    Behaviors: isolating/withdrawing  and not taking care of myself    Situations: pain and feeling unsupported by my medical " "providers   Step 2: Coping strategies - Things I can do to take my mind off of my problems without contacting another person (relaxation technique, physical activity):    Distress Tolerance Strategies:  read a book, listen to music    Physical Activities: go for a walk, meditation and deep breathing    Focus on helpful thoughts:  \"I will get through this\" and remind myself of what is important to me: my work and my family  Step 3: People and social settings that provide distraction:  Focus on helpful thoughts:  \"I will get through this\" and remind myself of what is important to me: my work and my family  Step 4: Remind myself of people and things that are important to me and worth living for:  My work, my family  Step 5: When I am in crisis, I can ask these people to help me use my safety plan:   Barbara  Phone:  [phone]   Who else?  Step 6: Making the environment safe:     secure medications:  , dispose of old medications  and be around others  Step 7: Professionals or agencies I can contact during a crisis:    Suicide Prevention Lifeline: 3-300-132652-024-LVQI (9539)    Crisis Text Line Service: Text   [place]  to 179849.    See crisis resources listed below    Call 911 or go to my nearest emergency department.    Today s date:  November 10, 2021  Adapted from Safety Plan Template 2008 Noris Aguirre and Malcolm Ovalle is reprinted with the express permission of the authors.  No portion of the Safety Plan Template may be reproduced without the express, written permission.  You can contact the authors at bhs@[place].[facility] or [email or web address].     If you are experiencing any significant mental health symptoms, or need immediate support, here are some crisis resources that can be useful:     24/7 Crisis Hotlines:  National Suicide Prevention Hotline                                           693-881-YDMU (2405)     Crisis Hotlines by [facility]:  [facility] Crisis Line                                                     "                950.815.4260  [place] Crisis Line                                                          [phone]  [place] Crisis Line                                                                   [phone]  [place] COPE for Adults                                                     [phone]  [place] for Children                                                             [phone]  [place] for Adults                                                                   [phone]  [place] for Children                                                               [phone]  [place] Crisis Line                                                           [phone]        CRISIS TEXT LINE: Text 067-449 from anywhere, anytime, any crisis 24/7; OR SEE www.crisistextline.org      Walk-In Centers and Mobile Teams:  [facility] EmPATH space (Emergency Psychiatric Assessment, Treatment & Healing)  At [facility], [address], [place] [address], use ED entrance    EmPATH reimagines emergency mental health care by offering patients in crisis immediate access to a team of mental health experts in a calming, living-room-style environment. After a quick triage process, patients with mental health or substance use disorder needs are guided to EmPATH. There, they will get compassionate care to stabilize their situation, reducing the likelihood that they will need to be admitted into a hospital's inpatient psychiatric unit.     EmPATH spaces are designed to be soothing and relaxing, with natural light, comfortable seating, and private sensory rooms. Each unit is staffed by a highly skilled team that includes psychiatrists, psychologists, mental health nurses, clinical social workers, and other licensed mental health professionals who will work with each patient to identify needs, administer medications, and begin appropriate treatment and  healing.  [email or web address]-[email or web address]-[email or web address]-care     [facility] Walk-In Crisis Intervention         [phone]  Emergency entrance at [address] and [facility].     [facility] COPE (18+) Crisis Mobile Team                         [phone]     [facility] Child (under 17) Crisis Mobile Team       [phone]     [facility] for [facility] Walk-In & Mobile Teams         [phone]

## 2021-11-12 ENCOUNTER — TELEPHONE (OUTPATIENT)
Dept: OTOLARYNGOLOGY | Facility: CLINIC | Age: 61
End: 2021-11-12
Payer: COMMERCIAL

## 2021-11-12 ASSESSMENT — COLUMBIA-SUICIDE SEVERITY RATING SCALE - C-SSRS
TOTAL  NUMBER OF ABORTED OR SELF INTERRUPTED ATTEMPTS PAST 3 MONTHS: NO
2. HAVE YOU ACTUALLY HAD ANY THOUGHTS OF KILLING YOURSELF LIFETIME?: YES
3. HAVE YOU BEEN THINKING ABOUT HOW YOU MIGHT KILL YOURSELF?: YES
5. HAVE YOU STARTED TO WORK OUT OR WORKED OUT THE DETAILS OF HOW TO KILL YOURSELF? DO YOU INTEND TO CARRY OUT THIS PLAN?: NO
5. HAVE YOU STARTED TO WORK OUT OR WORKED OUT THE DETAILS OF HOW TO KILL YOURSELF? DO YOU INTEND TO CARRY OUT THIS PLAN?: NO
4. HAVE YOU HAD THESE THOUGHTS AND HAD SOME INTENTION OF ACTING ON THEM?: NO
1. IN THE PAST MONTH, HAVE YOU WISHED YOU WERE DEAD OR WISHED YOU COULD GO TO SLEEP AND NOT WAKE UP?: YES
TOTAL  NUMBER OF INTERRUPTED ATTEMPTS PAST 3 MONTHS: NO
6. HAVE YOU EVER DONE ANYTHING, STARTED TO DO ANYTHING, OR PREPARED TO DO ANYTHING TO END YOUR LIFE?: NO
2. HAVE YOU ACTUALLY HAD ANY THOUGHTS OF KILLING YOURSELF?: YES
1. IN THE PAST MONTH, HAVE YOU WISHED YOU WERE DEAD OR WISHED YOU COULD GO TO SLEEP AND NOT WAKE UP?: YES
TOTAL  NUMBER OF ABORTED OR SELF INTERRUPTED ATTEMPTS PAST LIFETIME: NO
TOTAL  NUMBER OF INTERRUPTED ATTEMPTS LIFETIME: NO
ATTEMPT LIFETIME: NO
6. HAVE YOU EVER DONE ANYTHING, STARTED TO DO ANYTHING, OR PREPARED TO DO ANYTHING TO END YOUR LIFE?: NO
ATTEMPT PAST THREE MONTHS: NO
4. HAVE YOU HAD THESE THOUGHTS AND HAD SOME INTENTION OF ACTING ON THEM?: NO

## 2021-11-14 ENCOUNTER — HEALTH MAINTENANCE LETTER (OUTPATIENT)
Age: 61
End: 2021-11-14

## 2022-01-09 ENCOUNTER — HEALTH MAINTENANCE LETTER (OUTPATIENT)
Age: 62
End: 2022-01-09

## 2022-02-14 ENCOUNTER — TELEPHONE (OUTPATIENT)
Dept: NEUROSURGERY | Facility: CLINIC | Age: 62
End: 2022-02-14

## 2022-02-14 NOTE — TELEPHONE ENCOUNTER
Outbound call to patient, Mailbox Full, unable to leave a message.  Note to be sent to scheduling for appointment with Arlene Mata NP for update on facial pain and refill of Cymbalta.  My Chart message sent to patient explaining schedulers will be calling to set up a Telephone appointment this week with JANIA Mata NP, part of the facial pain team.

## 2022-02-15 ENCOUNTER — TELEPHONE (OUTPATIENT)
Dept: NEUROSURGERY | Facility: CLINIC | Age: 62
End: 2022-02-15
Payer: COMMERCIAL

## 2022-02-16 ENCOUNTER — VIRTUAL VISIT (OUTPATIENT)
Dept: NEUROSURGERY | Facility: CLINIC | Age: 62
End: 2022-02-16
Payer: COMMERCIAL

## 2022-02-16 DIAGNOSIS — M79.18 MYOFASCIAL PAIN: Primary | ICD-10-CM

## 2022-02-16 PROCEDURE — 99213 OFFICE O/P EST LOW 20 MIN: CPT | Mod: 95 | Performed by: NURSE PRACTITIONER

## 2022-02-16 NOTE — LETTER
"2022       RE: Dilan Hawkins  [address]  [address]  [place] [address]     Dear Colleague,    Thank you for referring your patient, Dilan Hawkins, to the [facility] NEUROSURGERY [facility]IC [place] at [facility]. Please see a copy of my visit note below.    [facility]  Department of Neurosurgery      Name: Dilan Hawkins  MRN: [id number]  Age: 61 year old  : 1960  Referring provider: Hira Esquivel  2022      Chief Complaint:   Atypical facial pain  Routine follow up    History of Present Illness:   Dilan Hawkins is a 61 year old male with a history of  Right sided facial pain who is seen today for routine follow up. He initially presented to our clinic in 10/2020. His right sided facial pain started at the age of 17 after a tooth extraction. Over the years, his pain became worse and was significantly affecting his life. He was started on Cymbalta during his visit with Dr. Esquivel on 10/20/2020.     In 2020, he was seen by our Multi Disciplinary Facial Pain team and was referred to Dr. Stroud [facility]. Today, patient reports that initially he was treated with \"some injections\" and in 2021he underwent a dental procedure to remove \"sharp bone piece from my gum\". These records are not acessable via Epic. Since then, he has been completely pain free and is very excited about it. He continues to be on Cymbalta 40 mg, now for mood stabilization. This is currently prescribed by his Psychiatrist.     Review of Systems:   Pertinent items are noted in HPI or as in patient entered ROS below, remainder of complete ROS is negative.   No flowsheet data found.     Physical Exam: N/A (Telephone visit)  There were no vitals taken for this visit.     Imaging:  No new imaging.     Assessment:  Atypical Facial Pain   Routine follow up    Plan:  I am pleased to hear that he was able to get complete pain relief " after the explorative oral procedure to remove the bone fragment?. No scheduled follow up needed in our clinic at this time. Patient to contact the clinic if any additional questions or concerns.      Arelne Mata CNP  Department of Neurosurgery      I spent 20 minutes on patient care activities related to this encounter on the date of service, including time spent reviewing the chart, obtaining history and examination and in counseling the patient, and in documentation in the electronic medical record.        Again, thank you for allowing me to participate in the care of your patient.      Sincerely,    AMMY Rojas CNP

## 2022-02-16 NOTE — PROGRESS NOTES
"Dilan is a 61 year old who is being evaluated via a billable telephone visit.      What phone number would you like to be contacted at? [phone]  How would you like to obtain your AVS? Ibeth  Phone call duration: 10 minutes    Chief Complaint   Patient presents with     RECHECK     TELEPHONE VISIT RETURN      Noel Ovalle    [facility]  Department of Neurosurgery      Name: Dilan Hawkins  MRN: [id number]  Age: 61 year old  : 1960  Referring provider: Hira Esquivel  2022      Chief Complaint:   Atypical facial pain  Routine follow up    History of Present Illness:   Dilan Hawkins is a 61 year old male with a history of  Right sided facial pain who is seen today for routine follow up. He initially presented to our clinic in 10/2020. His right sided facial pain started at the age of 17 after a tooth extraction. Over the years, his pain became worse and was significantly affecting his life. He was started on Cymbalta during his visit with Dr. Esquivel on 10/20/2020.     In 2020, he was seen by our Multi Disciplinary Facial Pain team and was referred to Dr. Stroud [facility]. Today, patient reports that initially he was treated with \"some injections\" and in 2021he underwent a dental procedure to remove \"sharp bone piece from my gum\". These records are not acessable via Epic. Since then, he has been completely pain free and is very excited about it. He continues to be on Cymbalta 40 mg, now for mood stabilization. This is currently prescribed by his Psychiatrist.     Review of Systems:   Pertinent items are noted in HPI or as in patient entered ROS below, remainder of complete ROS is negative.   No flowsheet data found.     Physical Exam: N/A (Telephone visit)  There were no vitals taken for this visit.     Imaging:  No new imaging.     Assessment:  Atypical Facial Pain   Routine follow up    Plan:  I am pleased to hear that he was able to get complete pain " relief after the explorative oral procedure to remove the bone fragment?. No scheduled follow up needed in our clinic at this time. Patient to contact the clinic if any additional questions or concerns.      Arlene Mata CNP  Department of Neurosurgery      I spent 20 minutes on patient care activities related to this encounter on the date of service, including time spent reviewing the chart, obtaining history and examination and in counseling the patient, and in documentation in the electronic medical record.

## 2022-03-06 ENCOUNTER — HEALTH MAINTENANCE LETTER (OUTPATIENT)
Age: 62
End: 2022-03-06

## 2022-06-26 ENCOUNTER — HEALTH MAINTENANCE LETTER (OUTPATIENT)
Age: 62
End: 2022-06-26

## 2022-11-21 ENCOUNTER — HEALTH MAINTENANCE LETTER (OUTPATIENT)
Age: 62
End: 2022-11-21

## 2023-04-16 ENCOUNTER — HEALTH MAINTENANCE LETTER (OUTPATIENT)
Age: 63
End: 2023-04-16

## 2023-09-17 ENCOUNTER — HEALTH MAINTENANCE LETTER (OUTPATIENT)
Age: 63
End: 2023-09-17

## 2024-02-04 ENCOUNTER — HEALTH MAINTENANCE LETTER (OUTPATIENT)
Age: 64
End: 2024-02-04

## 2024-06-23 ENCOUNTER — HEALTH MAINTENANCE LETTER (OUTPATIENT)
Age: 64
End: 2024-06-23

## 2024-11-10 ENCOUNTER — HEALTH MAINTENANCE LETTER (OUTPATIENT)
Age: 64
End: 2024-11-10

## 2025-03-02 ENCOUNTER — HEALTH MAINTENANCE LETTER (OUTPATIENT)
Age: 65
End: 2025-03-02